# Patient Record
Sex: MALE | Race: WHITE | NOT HISPANIC OR LATINO | Employment: OTHER | URBAN - METROPOLITAN AREA
[De-identification: names, ages, dates, MRNs, and addresses within clinical notes are randomized per-mention and may not be internally consistent; named-entity substitution may affect disease eponyms.]

---

## 2018-06-25 ENCOUNTER — HOSPITAL ENCOUNTER (OUTPATIENT)
Dept: SPEECH THERAPY | Facility: HOSPITAL | Age: 63
Setting detail: THERAPIES SERIES
Discharge: HOME | End: 2018-06-25

## 2018-06-25 DIAGNOSIS — I69.391 DYSPHAGIA AS LATE EFFECT OF CEREBROVASCULAR ACCIDENT (CVA): Primary | ICD-10-CM

## 2018-06-25 PROCEDURE — G8996 SWALLOW CURRENT STATUS: HCPCS

## 2018-06-25 PROCEDURE — G8997 SWALLOW GOAL STATUS: HCPCS

## 2018-06-25 PROCEDURE — 92610 EVALUATE SWALLOWING FUNCTION: CPT

## 2018-06-25 NOTE — THERAPY EVALUATION
Outpatient Speech Language Pathology   Adult Swallow Initial Evaluation  MARNIE Akins     Patient Name: Robert Manning  : 1955  MRN: [id number]  Today's Date: 2018         Visit Date: 2018   There is no problem list on file for this patient.       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 Dysphagia as late effect of cerebrovascular accident (CVA) I69.391 438.82             SLP Adult Swallow Evaluation - 18 1030        Rehab Evaluation    Document Type evaluation  -AD    Total Evaluation Minutes, SLP 60  -AD    Subjective Information no complaints  -AD    Patient Observations alert;cooperative;agree to therapy  -AD    Patient/Family Observations Pt seen upright in a chair in SLP office. Wife remained in the waiting room through most of the evaluation. Present at the end for recommendations and questions.   -AD    Patient Effort good  -AD    Symptoms Noted During/After Treatment other (see comments)   cough  -AD       General Information    Patient Profile Reviewed yes  -AD    Pertinent History Of Current Problem Pt is a 63 year old male referred for outpatient therapy following hospitalization and acute rehab for a stroke. He suffered a right sided stroke with left sided weakness. Onset on 18 with immediate thrombectomy by neurosurgery due to inability to take IV tPA.  Pt with hypersense sign of the right MCA per CT scan. CTA wtih occulusion of distal supraclinoid right ICA and M1/M2 segments fo  the right MCA. Initial symptoms were left sided weakness, slurred speech and confusion. Pt failed initial MBS on 6/11/18 and PEG tube placed on 6/12/18. Repeat MBS on 6/19/18 with continued high risk of aspiration and continued NPO status with PEG tube feedings recommended.   -AD    Current Method of Nutrition NPO   Pt reports some po intake of eggs.   -AD    Precautions/Limitations, Vision WFL;for purposes of eval  -AD    Precautions/Limitations, Hearing WFL;for purposes of eval  -AD    Prior Level of Function-Communication WFL  -AD    Prior Level of Function-Swallowing regular textures;thin liquids;other (see comments)   concerns w/prior dysphagia due to tracheostoma/pt report  -AD    Plans/Goals Discussed with patient;spouse/S.O.;agreed upon  -AD    Barriers to Rehab previous functional deficit  -AD    Patient's Goals for Discharge return to all previous roles/activities;return to regular diet  -AD    Family Goals for Discharge patient able to return to regular diet;patient able to return to all previous activities/roles  -AD       Pain Assessment    Additional Documentation --   no pain reported during evaluation  -AD       Oral Motor and Function    Oral Lesions or Structural Abnormalities and/or variants 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 Pt reports he did not tolerate a trach and this procedure was performed. He states that he did consult an ENT and that the tracheostoma cannot be reversed without significant complications.   -AD    Dentition Assessment natural, present and adequate;teeth are in poor condition  -AD    Secretion Management WNL/WFL;other (see comments)   some loss of secretions out of tracheostoma  -AD    Mucosal Quality moist, healthy  -AD    Volitional Swallow WFL;weak   mild weakness  -AD    Volitional Cough WFL  -AD       Oral Musculature and Cranial Nerve Assessment    Oral Motor General Assessment WFL  -AD    Vocal Impairment, Detail.  Cranial Nerve X (Vagus) vocal quality abnormality (see comments)   breathy due to air escape out of tracheostoma  -AD    Oral Motor, Comment Pt with functional oral motor movement of the lips, tongue, jaw and face. Some decreased laryngeal elevation reported and noted upon palpation. Vocal changes per tracheostoma.  -AD       General Eating/Swallowing Observations    Respiratory Support Currently in Use room air   large bandaid to cover stoma for airway protection/filter  -AD    Eating/Swallowing Skills self-fed;appropriate self-feeding skills observed;respiratory changes during/following eating voice quality changes during/following eating  -AD    Positioning During Eating upright 90 degree;upright in chair  -AD    Utensils Used cup  -AD    Consistencies Trialed thin liquids   water only  -AD       Respiratory    Respiratory Status WFL;room air;during swallowing/eating  -AD       Clinical Swallow Eval    Oral Prep Phase WFL  -AD    Oral Transit WFL  -AD    Oral Residue WFL  -AD    Pharyngeal Phase suspected pharyngeal impairment  -AD    Esophageal Phase unremarkable  -AD    Clinical Swallow Evaluation Summary Pt presents with a moderate-severe oropharyngeal dypshagia with wet vocal quality noted after trials of water from a cup in small sips and prior report from [facility] Re[facility]/MBS. Pt with a delayed cough and throat clearing following trials but he reports that this is his norm even before the recent stroke. Wife agrees with his statement. No other po trials were attempted. Per records, MBS indicated 'delayed swallow initiation and premature loss likely caused from CVA, however osteophyte limited epiglottic inversion and cricopharyngeal opening and allowed for residue accumulation. Deep penetration before the swallow with nectar thick liquid and puree. Inability to cough or clear residue. All penetration/aspiration was silent. Recommend: NPO, with alternate rayne nutrition/hydration Dysphagia therapy Repeat MBSS 3-4  weeks following dysphagia therapy to further assess swallow function and consider further f/u with neurosurgery after completion of DT.'  -AD       Pharyngeal Phase Concerns    Pharyngeal Phase Concerns wet vocal quality;multiple swallows;cough;throat clear  -AD    Wet Vocal Quality thin  -AD    Multiple Swallows thin  -AD    Cough thin  -AD    Throat Clear thin  -AD    Pharyngeal Phase Concerns, Comment Concerns for silent aspiration, decreased laryngeal elevation and decreased airway closure.   -AD       Clinical Impression    SLP Swallowing Diagnosis mod-severe;oral dysfunction;pharyngeal dysfunction  -AD    Functional Impact risk of aspiration/pneumonia;risk of malnutrition  -AD    Rehab Potential/Prognosis, Swallowing adequate, monitor progress closely  -AD    Criteria for Skilled Therapeutic Interventions Met demonstrates skilled criteria  -AD    Equipment Issued to Patient --   tracheosponges  -AD       Recommendations    Therapy Frequency (Swallow) 1 day per week  -AD    Predicted Duration Therapy Intervention (Days) other (see comments)   1 month  -AD    SLP Diet Recommendation NPO;other (see comments)   ice chips, small sips of water after oral care  -AD    Recommended Diagnostics reassess via VFSS (MBS);reassess via FEES   may benefit from FEES due to anatomical changes  -AD    SLP Rec. for Method of Medication Administration meds via alternate route  -AD    Monitor for Signs of Aspiration yes;cough;elevated WBC count;gurgly voice;throat clearing;pneumonia;right lower lobe infiltrates;none - silent aspiration present  -AD    Anticipated Dischage Disposition home  -AD      User Key  (r) = Recorded By, (t) = Taken By, (c) = Cosigned By    Initials Name Provider Type    LULA Matos MS [facility]-SLP Speech Therapist         Note patient and wife declined cognitive communication evaluation due to both feeling he is back at his baseline status prior to this stroke. Education provided regarding risks and  benefits of this evaluation. Both verbalized understanding and willing to take the risk of not proceeding with the evaluation.           OP SLP Education     Row Name 06/25/18 1030       Education    Barriers to Learning Resistent to information  -AD    Action Taken to Address Barriers Education on risks and benefits of therapy and po intake.   -AD    Education Provided Described results of evaluation;Patient expressed understanding of evaluation;Family/caregivers expressed understanding of evaluation;Patient participated in establishing goals and treatment plan;Family/caregivers participated in establishing goals and treatment plan;Patient demonstrated recommended strategies;Family/caregivers demonstrated recommended strategies;Patient requires further education on strategies, risks;Family/caregivers require further education on strategies, risks  -AD    Assessed Learning needs;Learning motivation;Learning preferences;Learning readiness  -AD    Learning Motivation Strong  -AD    Learning Method Explanation;Demonstration;Teach back;Written materials  -AD    Teaching Response Verbalized understanding;Demonstrated understanding;Reinforcement needed  -AD    Education Comments Pt given laryngeal and pharyngeal exercises for home use. SLP reviewed and patient able to perform repeat demonstration. Education to pt/wife regarding aspiration and risks including pneumonia, choking and death. Both verbalized understanding and willing to take risks associated with po intake at this time.   -AD      User Key  (r) = Recorded By, (t) = Taken By, (c) = Cosigned By    Initials Name Effective Dates    AD Katie Matos MS [facility]-SLP 06/22/16 -                 SLP OP Goals     Row Name 06/25/18 1030          Goal Type Needed    Goal Type Needed Dysphagia  -AD        Subjective Comments    Subjective Comments Pt was seen for an initial evaluation for 60 minutes and was alert and cooperative during the eval. His wife was present for a  summary of results and recommendations. She also provided agreement to patient report of history and recent events.   -AD        Subjective Pain    Able to rate subjective pain? --   no pain reported  -AD        Dysphagia Goals    Dysphagia LTG's Pt will tolerate some PO diet w/o aspiration to adequately meet nutrition/hydration needs  -AD     Pt will tolerate some PO diet w/o aspiration to adequately meet nutrition/hydration needs diet to be determined via repeat MBS/FEES in one month.   -AD     Status: Pt will tolerate some PO diet w/o aspiration to adequately meet nutrition/hydration needs New  -AD     Patient will increase laryngeal elevation to reduce residue that might fall into airway by completing Mendelsohn maneuver;falsetto/pitch glide  -AD     Status: Patient will increase laryngeal elevation to reduce residue that might fall into airway by completing New  -AD     Patient will increase closure of larynx to keep food from falling into the airway by completing with intermittent cues;produce vowel with hard glottal attack   80% and use of stoma closure via hand  -AD     Status: Patient will increase closure of larynx to keep food from falling into the airway by completing New  -AD     Patient will increase strength of tongue base and posterior pharyngeal walls to reduce residue that might fall into airway by completing effotful swallow;Jesenia (tongue hold);tongue base retraction;with intermittent cues   80%  -AD     Status: Patient will increase strength of tongue base and posterior pharyngeal walls to reduce residue that might fall into airway by completing New  -AD     Patient will increase tipping of arytenoids and closure at entrance to the airway to reduce penetration into the vestibule by completing super-supraglottic swallow;with intermittent cues   80% of trials of water  -AD     Status: Patient will increase tipping of arytenoids and closure at entrance to the airway to reduce penetration into the  vestibule by completing New  -AD        SLP Time Calculation    SLP Goal Re-Cert Due Date 07/25/18  -AD       User Key  (r) = Recorded By, (t) = Taken By, (c) = Cosigned By    Initials Name Provider Type    LULA Matos MS CCC-SLP Speech Therapist                OP SLP Assessment/Plan - 06/25/18 1030        SLP Assessment    Functional Problems Swallowing  -AD    Impact on Function: Swallowing Risk of malnourishment;Risk of aspiration;Risk of pneumonia  -AD    Clinical Impression: Swallowing Moderate-Severe:;oropharyngeal phase dysphagia  -AD    SLP Diagnosis Moderate to severe oropharyngeal dysphagia  -AD    Prognosis Fair (comment)   due to suspected prior deficits and tracheostoma/osteophytes  -AD    Patient/caregiver participated in establishment of treatment plan and goals Yes  -AD    Patient would benefit from skilled therapy intervention Yes  -AD       SLP Plan    Frequency once weekly  -AD    Duration 1 month  -AD    Planned CPT's? SLP SWALLOW THERAPY: 89877;SLP MBS: 59418  -AD    Expected Duration Therapy Session - minutes 30-45 minutes  -AD      User Key  (r) = Recorded By, (t) = Taken By, (c) = Cosigned By    Initials Name Provider Type    LULA Matos MS CCC-SLP Speech Therapist          Time Calculation:   SLP Start Time: 0930  SLP Stop Time: 1030  SLP Time Calculation (min): 60 min  SLP Non-Billable Time (min): 0 min  Total Timed Code Minutes- SLP: 0 minute(s)    Therapy Charges for Today     Code Description Service Date Service Provider Modifiers Qty    39139202168 HC ST EVAL ORAL PHARYNG SWALLOW 4 6/25/2018 Katie Matos MS CCC-SLP GN 1    [id number] HC ST SWALLOWING CURRENT STATUS 6/25/2018 Katie Matos MS CCC-SLP GN, CN 1    [id number] HC ST SWALLOWING PROJECTED 6/25/2018 Katie Matos MS CCC-SLP ELIDIA, CK 1        SLP G-Codes  SLP NOMS Used?: Yes  Functional Limitations: Swallowing  Swallow Current Status (): 100 percent impaired, limited or  restricted  Swallow Goal Status (): At least 40 percent but less than 60 percent impaired, limited or restricted      Katie Matos, MS CCC-SLP  6/25/2018

## 2018-07-03 ENCOUNTER — HOSPITAL ENCOUNTER (OUTPATIENT)
Dept: SPEECH THERAPY | Facility: HOSPITAL | Age: 63
Setting detail: THERAPIES SERIES
Discharge: HOME | End: 2018-07-03

## 2018-07-03 DIAGNOSIS — I69.391 DYSPHAGIA AS LATE EFFECT OF CEREBROVASCULAR ACCIDENT (CVA): Primary | ICD-10-CM

## 2018-07-03 PROCEDURE — 92526 ORAL FUNCTION THERAPY: CPT

## 2018-07-03 NOTE — THERAPY TREATMENT NOTE
Outpatient Speech Language Pathology   Adult Swallow Treatment Note  MARNIE Akins     Patient Name: Robert Manning  : 1955  MRN: [id number]  Today's Date: 7/3/2018         Visit Date: 2018   There is no problem list on file for this patient.       Visit Dx:    ICD-10-CM ICD-9-CM   1. Dysphagia as late effect of cerebrovascular accident (CVA) I69.391 438.82           SLP OP Goals     Row Name 18 0945          Goal Type Needed    Goal Type Needed Dysphagia  -AD        Subjective Comments    Subjective Comments Pt was seen in therapy for 40 minutes and was alert and cooperative.   -AD        Subjective Pain    Able to rate subjective pain? --   no pain reported.   -AD        Dysphagia Goals    Dysphagia LTG's Pt will tolerate some PO diet w/o aspiration to adequately meet nutrition/hydration needs  -AD     Pt will tolerate some PO diet w/o aspiration to adequately meet nutrition/hydration needs diet to be determined via repeat MBS/FEES in one month.   -AD     Status: Pt will tolerate some PO diet w/o aspiration to adequately meet nutrition/hydration needs Progressing as expected  -AD     Comments: Pt will tolerate some PO diet w/o aspiration to adequately meet nutrition/hydration needs Pt states that he is taking po by mouth as well as by his feeding tube. He states that he is tolerating about the equivalent of 2 meals per day and taking anywhere from 3-4 bottles of tube feeding a day with water flushes. He states he is tolerating all kinds of food and thin liquids including steak and would like to get his feeding tube taken out. SLP provided education regarding risk of aspiration and complications such as aspiration pneumonia. Pt verbalizes understanding and chooses to accept the risks and continue to eat.   -AD     Patient will increase laryngeal elevation to reduce residue that might fall into airway by completing Mendelsohn maneuver;falsetto/pitch glide  -AD     Status: Patient will increase  laryngeal elevation to reduce residue that might fall into airway by completing Progress slower than expected  -AD     Comments: Patient will increase laryngeal elevation to reduce residue that might fall into airway by completing Pt attempts to perform the Mendelsohn, but continues to have limited holding of elevation during the swallow. Only able to attempt on 2 trials. Able to perform falsetto and pitch glides with consistent verbal cues for 10 trials each. Limited high pitch achieved, but improved from previous session with use of model and consistent verbal prompts to raise higher. Better performance noted on pitch glides vs falsetto.  -AD     Patient will increase closure of larynx to keep food from falling into the airway by completing with intermittent cues;produce vowel with hard glottal attack   80% and use of stoma closure via hand  -AD     Status: Patient will increase closure of larynx to keep food from falling into the airway by completing Progressing as expected  -AD     Comments: Patient will increase closure of larynx to keep food from falling into the airway by completing Attempted closure of the stoma with external coverage by the hand (over the bandage used as a filter). No significant change noted. Pt was able to perofm a hard glottal attack on a vowel with only the bandage and no external pressure on 8/10 trials without cues.   -AD     Patient will increase strength of tongue base and posterior pharyngeal walls to reduce residue that might fall into airway by completing effotful swallow;Jesenia (tongue hold);tongue base retraction;with intermittent cues   80%  -AD     Status: Patient will increase strength of tongue base and posterior pharyngeal walls to reduce residue that might fall into airway by completing Progressing as expected  -AD     Comments: Patient will increase strength of tongue base and posterior pharyngeal walls to reduce residue that might fall into airway by completing Pt was  able to perform tongue hold with tongue to edge of lips on 10/10 trials; tongue base retraction on 10/10 trials w/o cues; effortful swallow on 4/5 trials with inconsistent verbal prompts.   -AD     Patient will increase tipping of arytenoids and closure at entrance to the airway to reduce penetration into the vestibule by completing super-supraglottic swallow;with intermittent cues   80% of trials of water  -AD     Status: Patient will increase tipping of arytenoids and closure at entrance to the airway to reduce penetration into the vestibule by completing Progress slower than expected  -AD     Comments: Patient will increase tipping of arytenoids and closure at entrance to the airway to reduce penetration into the vestibule by completing Able to perform with consistent cues on 3/6 trials. To continue at home.   -AD        SLP Time Calculation    SLP Goal Re-Cert Due Date 07/25/18  -AD       User Key  (r) = Recorded By, (t) = Taken By, (c) = Cosigned By    Initials Name Provider Type    LULA Matos MS CCC-SLP Speech Therapist                OP SLP Education     Row Name 07/03/18 0945       Education    Education Comments Pt demonstrates use of HEP for improved laryngeal elevation and BOT exercises. Non compliance with NPO recommendation but verbalizes understanding of the risks and willing to take those risks. Understanding of HME and recommendation for repeat MBS soon.   -AD      User Key  (r) = Recorded By, (t) = Taken By, (c) = Cosigned By    Initials Name Effective Dates    LULA Matos MS CCC-SLP 06/22/16 -                 OP SLP Assessment/Plan - 07/03/18 0945        SLP Assessment    Clinical Impression Comments Pt continues to demonstrate good performance on his home exercises and can relay the frequency and duration. He is not compliant with NPO recommendations but has not demonstrated any complications at his point and time and reports no choking. Good tolerance of exercises with  improved vocal quality today and less throat clearing and wet quality following trials of water.   -AD       SLP Plan    Plan Comments Will look into HME for patient and contact physician for order. This may help restore a more closed swallowing mechanism and improved swallowing functioning. Will also contact Dr. Rhodes about repeating the MBS soon due to noncompliance with NPO recommendation.   -AD      User Key  (r) = Recorded By, (t) = Taken By, (c) = Cosigned By    Initials Name Provider Type    AD Katie Matos MS CCC-SLP Speech Therapist        Time Calculation:   SLP Start Time: 0905  SLP Stop Time: 0945  SLP Time Calculation (min): 40 min  SLP Non-Billable Time (min): 0 min  Total Timed Code Minutes- SLP: 0 minute(s)    Therapy Charges for Today     Code Description Service Date Service Provider Modifiers Qty    [id number] HC ST TREATMENT SWALLOW 3 7/3/2018 Katie Matos MS CCC-SLP GN 1        Katie Matos MS CCC-SLP  7/3/2018

## 2018-07-19 ENCOUNTER — HOSPITAL ENCOUNTER (OUTPATIENT)
Dept: SPEECH THERAPY | Facility: HOSPITAL | Age: 63
Setting detail: THERAPIES SERIES
Discharge: HOME | End: 2018-07-19

## 2018-07-19 DIAGNOSIS — I69.391 DYSPHAGIA AS LATE EFFECT OF CEREBROVASCULAR ACCIDENT (CVA): Primary | ICD-10-CM

## 2018-07-19 NOTE — THERAPY TREATMENT NOTE
Outpatient Speech Language Pathology   Adult Swallow Follow Up Note  MARNIE Akins     Patient Name: Robert Manning  : 1955  MRN: [id number]  Today's Date: 2018         Visit Date: 2018   There is no problem list on file for this patient.       Visit Dx:    ICD-10-CM ICD-9-CM   1. Dysphagia as late effect of cerebrovascular accident (CVA) I69.391 438.82           SLP OP Goals     Row Name 18 1320          Goal Type Needed    Goal Type Needed Dysphagia  -AD        Subjective Comments    Subjective Comments Pt seen in speech therapy office for 15 minutes for review of current status. Pt alert and cooperative.   -AD        Subjective Pain    Able to rate subjective pain? --   no pain reported  -AD        Dysphagia Goals    Dysphagia LTG's Pt will tolerate some PO diet w/o aspiration to adequately meet nutrition/hydration needs  -AD     Pt will tolerate some PO diet w/o aspiration to adequately meet nutrition/hydration needs diet to be determined via repeat MBS/FEES in one month.   -AD     Status: Pt will tolerate some PO diet w/o aspiration to adequately meet nutrition/hydration needs Progressing as expected  -AD     Comments: Pt will tolerate some PO diet w/o aspiration to adequately meet nutrition/hydration needs Pt reports continued po intake of a regular diet with thin liquids. He states he is having no issues and has only used his tube one time in the last week. He states he is tolerating 3 meals per day and is following up with the surgeon who put in his PEG tube for possible removal next week. Discussion regarding follow up MBS. Pt states he would like to think about proceeding with this until after he sees the surgeon. SLP stated physician contacted for order and will follow up with the patient when the order received and after his appointment next week.   -AD     Patient will increase laryngeal elevation to reduce residue that might fall into airway by completing Mendelsohn  maneuver;falsetto/pitch glide  -AD     Status: Patient will increase laryngeal elevation to reduce residue that might fall into airway by completing Progress slower than expected;Discontinued  -AD     Comments: Patient will increase laryngeal elevation to reduce residue that might fall into airway by completing Pt able to perform pitch glides consistently with minimal cues for increased range when possible. He is still unable to perform the Mendelsohn despite cues and effort. Discontinued. Pt to continue with pitch glides at home per HEP.   -AD     Patient will increase closure of larynx to keep food from falling into the airway by completing with intermittent cues;produce vowel with hard glottal attack   80% and use of stoma closure via hand  -AD     Status: Patient will increase closure of larynx to keep food from falling into the airway by completing Achieved  -AD     Comments: Patient will increase closure of larynx to keep food from falling into the airway by completing Pt able to produce a vowel with a hard glottal attack without closure of the stoma with 80% spontaneously.   -AD     Patient will increase strength of tongue base and posterior pharyngeal walls to reduce residue that might fall into airway by completing effotful swallow;Jesenia (tongue hold);tongue base retraction;with intermittent cues   80%   -AD     Status: Patient will increase strength of tongue base and posterior pharyngeal walls to reduce residue that might fall into airway by completing Achieved  -AD     Comments: Patient will increase strength of tongue base and posterior pharyngeal walls to reduce residue that might fall into airway by completing Pt able to perform tongue base retraction exercises, jesenia, and effortful swallow independently without cues. Reports use of HEP for these at home.   -AD     Patient will increase tipping of arytenoids and closure at entrance to the airway to reduce penetration into the vestibule by completing  super-supraglottic swallow;with intermittent cues   80% of trials of water  -AD     Status: Patient will increase tipping of arytenoids and closure at entrance to the airway to reduce penetration into the vestibule by completing Progress slower than expected;Discontinued  -AD     Comments: Patient will increase tipping of arytenoids and closure at entrance to the airway to reduce penetration into the vestibule by completing Not targeted today. Pt able to perform prior on 50% of trials with consistent cues. He reports use at home but unsure of consistent nature and need for MBS to determine if effective and appropriate.   -AD        SLP Time Calculation    SLP Goal Re-Cert Due Date 07/25/18  -AD       User Key  (r) = Recorded By, (t) = Taken By, (c) = Cosigned By    Initials Name Provider Type    LULA Matos MS CCC-SLP Speech Therapist                OP SLP Education     Row Name 07/19/18 1320       Education    Education Comments Pt independent with HEP and understanding of risks of po diet intake.   -AD      User Key  (r) = Recorded By, (t) = Taken By, (c) = Cosigned By    Initials Name Effective Dates    LULA Matos MS CCC-SLP 06/22/16 -                 OP SLP Assessment/Plan - 07/19/18 1320        SLP Assessment    Clinical Impression Comments Pt demonstrates independence with his home exercise program with the exception of Mendelsohn and SSG. Pt continues to report intake of a regular diet with thin liquids w/o signs of aspiration pneumonia. Some signs of aspiration reported on chocolate and occasional choking at times but reports this baseline. Follow up with surgeon to occur next week and will further discuss repeat MBS after that time. SLP also working on get some type of HME device to assist with humidification and filtering of his stoma. Awaiting contact from primary care physician and HME representative. Will contact pt when this is completed.   -AD    SLP Diagnosis Moderate to severe  oropharyngeal dysphagia.   -AD       SLP Plan    Plan Comments Await return phone calls regarding HME and MBS orders.   -AD      User Key  (r) = Recorded By, (t) = Taken By, (c) = Cosigned By    Initials Name Provider Type    AD Katie Matos, MS CCC-SLP Speech Therapist          Time Calculation:   SLP Start Time: 1305  SLP Stop Time: 1320  SLP Time Calculation (min): 15 min  SLP Non-Billable Time (min): 0 min  Total Timed Code Minutes- SLP: 0 minute(s)     No charge filed.     Katie Matos, MS CCC-SLP  7/19/2018

## 2018-08-16 ENCOUNTER — DOCUMENTATION (OUTPATIENT)
Dept: SPEECH THERAPY | Facility: HOSPITAL | Age: 63
End: 2018-08-16

## 2018-08-16 NOTE — THERAPY TREATMENT NOTE
Outpatient Speech Language Pathology   Adult Swallow Documentation Note       Patient Name: Robert Manning  : 1955  MRN: [id number]  Today's Date: 2018         Visit Date: 2018         SLP saw patient today with representative, Rashard Pruett, from [employer] for demonstration and fitting of an HME device. Pt able to try on devices for use with his permanent tracheostoma. Samples given and information for ordering also given. Will need a physician's prescription to order on a regular basis if patient interested. Contact information given and pt to return call to SLP if he likes the HME and would like to order more.     Products trialed and samples provided were:     [employer] EasyFLow HME cartridge  [employer] TruSeal Adhesive Housing      Time Calculation:   SLP Start Time: 1400    Await return call from patient before proceeding further with order.    Katie Matos MS CCC-SLP  2018

## 2019-03-27 ENCOUNTER — HOSPITAL ENCOUNTER (OUTPATIENT)
Dept: GENERAL RADIOLOGY | Facility: HOSPITAL | Age: 64
Discharge: HOME | End: 2019-03-27
Admitting: INTERNAL MEDICINE

## 2019-03-27 ENCOUNTER — TRANSCRIBE ORDERS (OUTPATIENT)
Dept: ADMINISTRATIVE | Facility: HOSPITAL | Age: 64
End: 2019-03-27

## 2019-03-27 DIAGNOSIS — R04.2 HEMOPTYSIS: Primary | ICD-10-CM

## 2019-03-27 DIAGNOSIS — R04.2 HEMOPTYSIS: ICD-10-CM

## 2019-03-27 PROCEDURE — 71046 X-RAY EXAM CHEST 2 VIEWS: CPT

## 2019-04-21 RX ORDER — SODIUM CHLORIDE 9 MG/ML
INJECTION, SOLUTION INTRAVENOUS
Status: DISPENSED
Start: 2019-04-21 | End: 2019-04-22

## 2020-10-09 ENCOUNTER — TRANSCRIBE ORDERS (OUTPATIENT)
Dept: ADMINISTRATIVE | Facility: HOSPITAL | Age: 65
End: 2020-10-09

## 2020-10-09 DIAGNOSIS — R16.0 HEPATOMEGALY: ICD-10-CM

## 2020-10-09 DIAGNOSIS — E04.9 GOITER: Primary | ICD-10-CM

## 2020-10-09 DIAGNOSIS — F17.290 NICOTINE DEPENDENCE, OTHER TOBACCO PRODUCT, UNCOMPLICATED: ICD-10-CM

## 2020-10-27 ENCOUNTER — APPOINTMENT (OUTPATIENT)
Dept: BONE DENSITY | Facility: HOSPITAL | Age: 65
End: 2020-10-27

## 2021-02-01 ENCOUNTER — DOCUMENTATION (OUTPATIENT)
Dept: SURGERY | Facility: CLINIC | Age: 66
End: 2021-02-01

## 2021-02-01 PROBLEM — Z86.73 PERSONAL HISTORY OF TRANSIENT ISCHEMIC ATTACK (TIA), AND CEREBRAL INFARCTION WITHOUT RESIDUAL DEFICITS: Status: ACTIVE | Noted: 2018-09-05

## 2021-02-01 PROBLEM — I49.1 APC (ATRIAL PREMATURE CONTRACTIONS): Status: ACTIVE | Noted: 2018-06-09

## 2021-02-01 PROBLEM — Z87.19 HISTORY OF GI BLEED: Status: ACTIVE | Noted: 2018-09-27

## 2021-02-01 PROBLEM — R94.39 ABNORMAL NUCLEAR STRESS TEST: Status: ACTIVE | Noted: 2017-11-21

## 2021-02-01 PROBLEM — I47.19 ATRIAL TACHYCARDIA: Status: ACTIVE | Noted: 2017-06-13

## 2021-02-01 PROBLEM — I47.1 ATRIAL TACHYCARDIA: Status: ACTIVE | Noted: 2017-06-13

## 2021-02-01 PROBLEM — I48.0 PAROXYSMAL ATRIAL FIBRILLATION (HCC): Status: ACTIVE | Noted: 2018-06-09

## 2021-02-01 PROBLEM — I25.5 ISCHEMIC CARDIOMYOPATHY: Status: ACTIVE | Noted: 2018-01-23

## 2021-02-01 PROBLEM — T82.198A PRESENCE OF MANUFACTURER-RECALLED ICD LEAD: Status: ACTIVE | Noted: 2017-03-28

## 2021-02-01 PROBLEM — Z45.02 ICD (IMPLANTABLE CARDIOVERTER-DEFIBRILLATOR) BATTERY DEPLETION: Status: ACTIVE | Noted: 2017-03-28

## 2021-02-01 PROBLEM — Z95.818 PRESENCE OF WATCHMAN LEFT ATRIAL APPENDAGE CLOSURE DEVICE: Status: ACTIVE | Noted: 2018-12-20

## 2021-02-01 PROBLEM — Z43.1 ATTENTION TO G-TUBE: Status: ACTIVE | Noted: 2018-07-24

## 2021-02-01 PROBLEM — I63.231 ACUTE ISCHEMIC RIGHT ICA STROKE: Status: ACTIVE | Noted: 2018-06-07

## 2021-04-07 RX ORDER — FLUOXETINE 20 MG/1
TABLET, FILM COATED ORAL
Qty: 90 TABLET | Refills: 0 | Status: SHIPPED | OUTPATIENT
Start: 2021-04-07 | End: 2022-02-09

## 2021-08-27 ENCOUNTER — OFFICE VISIT (OUTPATIENT)
Dept: INTERNAL MEDICINE | Facility: CLINIC | Age: 66
End: 2021-08-27

## 2021-08-27 VITALS
HEIGHT: 70 IN | WEIGHT: 236.8 LBS | HEART RATE: 70 BPM | TEMPERATURE: 96.9 F | OXYGEN SATURATION: 92 % | SYSTOLIC BLOOD PRESSURE: 146 MMHG | RESPIRATION RATE: 16 BRPM | DIASTOLIC BLOOD PRESSURE: 86 MMHG | BODY MASS INDEX: 33.9 KG/M2

## 2021-08-27 DIAGNOSIS — Z11.59 ENCOUNTER FOR HCV SCREENING TEST FOR LOW RISK PATIENT: ICD-10-CM

## 2021-08-27 DIAGNOSIS — I48.91 ATRIAL FIBRILLATION, UNSPECIFIED TYPE (HCC): ICD-10-CM

## 2021-08-27 DIAGNOSIS — I10 ESSENTIAL HYPERTENSION: Primary | ICD-10-CM

## 2021-08-27 DIAGNOSIS — R73.01 ELEVATED FASTING GLUCOSE: ICD-10-CM

## 2021-08-27 DIAGNOSIS — Z11.4 ENCOUNTER FOR SCREENING FOR HIV: ICD-10-CM

## 2021-08-27 DIAGNOSIS — E78.2 MIXED HYPERLIPIDEMIA: ICD-10-CM

## 2021-08-27 PROCEDURE — 99204 OFFICE O/P NEW MOD 45 MIN: CPT | Performed by: INTERNAL MEDICINE

## 2021-08-27 RX ORDER — DILTIAZEM HYDROCHLORIDE 60 MG/1
60 TABLET, FILM COATED ORAL
COMMUNITY
Start: 2021-07-17 | End: 2022-12-29

## 2021-08-27 RX ORDER — CLOPIDOGREL BISULFATE 75 MG/1
TABLET ORAL
COMMUNITY
Start: 2021-06-02

## 2021-08-27 RX ORDER — SOTALOL HYDROCHLORIDE 80 MG/1
TABLET ORAL
COMMUNITY
Start: 2021-05-28

## 2021-08-27 RX ORDER — LOSARTAN POTASSIUM 25 MG/1
25 TABLET ORAL DAILY
COMMUNITY
Start: 2021-07-24

## 2021-08-27 RX ORDER — ASPIRIN 81 MG/1
81 TABLET ORAL DAILY
COMMUNITY
Start: 2021-06-08

## 2021-08-27 RX ORDER — CARVEDILOL 12.5 MG/1
12.5 TABLET ORAL
COMMUNITY
Start: 2021-07-17 | End: 2023-01-02

## 2021-08-28 LAB
ALBUMIN SERPL-MCNC: 3.9 G/DL (ref 3.8–4.8)
ALBUMIN/GLOB SERPL: 1.6 {RATIO} (ref 1.2–2.2)
ALP SERPL-CCNC: 210 IU/L (ref 48–121)
ALT SERPL-CCNC: 15 IU/L (ref 0–44)
AST SERPL-CCNC: 19 IU/L (ref 0–40)
BASOPHILS # BLD AUTO: 0.1 X10E3/UL (ref 0–0.2)
BASOPHILS NFR BLD AUTO: 1 %
BILIRUB SERPL-MCNC: 0.5 MG/DL (ref 0–1.2)
BUN SERPL-MCNC: 8 MG/DL (ref 8–27)
BUN/CREAT SERPL: 9 (ref 10–24)
CALCIUM SERPL-MCNC: 9 MG/DL (ref 8.6–10.2)
CHLORIDE SERPL-SCNC: 102 MMOL/L (ref 96–106)
CHOLEST SERPL-MCNC: 160 MG/DL (ref 100–199)
CHOLEST/HDLC SERPL: 3.6 RATIO (ref 0–5)
CO2 SERPL-SCNC: 29 MMOL/L (ref 20–29)
CREAT SERPL-MCNC: 0.85 MG/DL (ref 0.76–1.27)
EOSINOPHIL # BLD AUTO: 0.2 X10E3/UL (ref 0–0.4)
EOSINOPHIL NFR BLD AUTO: 3 %
ERYTHROCYTE [DISTWIDTH] IN BLOOD BY AUTOMATED COUNT: 12.9 % (ref 11.6–15.4)
GLOBULIN SER CALC-MCNC: 2.5 G/DL (ref 1.5–4.5)
GLUCOSE SERPL-MCNC: 72 MG/DL (ref 65–99)
HBA1C MFR BLD: 7 % (ref 4.8–5.6)
HCT VFR BLD AUTO: 44.3 % (ref 37.5–51)
HCV AB S/CO SERPL IA: <0.1 S/CO RATIO (ref 0–0.9)
HDLC SERPL-MCNC: 44 MG/DL
HGB BLD-MCNC: 15.2 G/DL (ref 13–17.7)
HIV 1+2 AB+HIV1 P24 AG SERPL QL IA: NON REACTIVE
IMM GRANULOCYTES # BLD AUTO: 0 X10E3/UL (ref 0–0.1)
IMM GRANULOCYTES NFR BLD AUTO: 0 %
LDLC SERPL CALC-MCNC: 70 MG/DL (ref 0–99)
LYMPHOCYTES # BLD AUTO: 1.8 X10E3/UL (ref 0.7–3.1)
LYMPHOCYTES NFR BLD AUTO: 28 %
MCH RBC QN AUTO: 32.4 PG (ref 26.6–33)
MCHC RBC AUTO-ENTMCNC: 34.3 G/DL (ref 31.5–35.7)
MCV RBC AUTO: 95 FL (ref 79–97)
MONOCYTES # BLD AUTO: 0.9 X10E3/UL (ref 0.1–0.9)
MONOCYTES NFR BLD AUTO: 15 %
NEUTROPHILS # BLD AUTO: 3.4 X10E3/UL (ref 1.4–7)
NEUTROPHILS NFR BLD AUTO: 53 %
PLATELET # BLD AUTO: 202 X10E3/UL (ref 150–450)
POTASSIUM SERPL-SCNC: 3.9 MMOL/L (ref 3.5–5.2)
PROT SERPL-MCNC: 6.4 G/DL (ref 6–8.5)
RBC # BLD AUTO: 4.69 X10E6/UL (ref 4.14–5.8)
SODIUM SERPL-SCNC: 144 MMOL/L (ref 134–144)
TRIGL SERPL-MCNC: 290 MG/DL (ref 0–149)
VLDLC SERPL CALC-MCNC: 46 MG/DL (ref 5–40)
WBC # BLD AUTO: 6.4 X10E3/UL (ref 3.4–10.8)

## 2021-09-15 ENCOUNTER — OFFICE VISIT (OUTPATIENT)
Dept: INTERNAL MEDICINE | Facility: CLINIC | Age: 66
End: 2021-09-15

## 2021-09-15 VITALS
OXYGEN SATURATION: 98 % | DIASTOLIC BLOOD PRESSURE: 90 MMHG | HEART RATE: 88 BPM | SYSTOLIC BLOOD PRESSURE: 148 MMHG | RESPIRATION RATE: 18 BRPM | BODY MASS INDEX: 33.5 KG/M2 | TEMPERATURE: 98.1 F | HEIGHT: 70 IN | WEIGHT: 234 LBS

## 2021-09-15 DIAGNOSIS — E11.65 TYPE 2 DIABETES MELLITUS WITH HYPERGLYCEMIA, WITHOUT LONG-TERM CURRENT USE OF INSULIN (HCC): ICD-10-CM

## 2021-09-15 DIAGNOSIS — I48.0 PAROXYSMAL ATRIAL FIBRILLATION (HCC): ICD-10-CM

## 2021-09-15 DIAGNOSIS — I25.10 CORONARY ARTERY DISEASE INVOLVING NATIVE CORONARY ARTERY OF NATIVE HEART, ANGINA PRESENCE UNSPECIFIED: Primary | ICD-10-CM

## 2021-09-15 PROCEDURE — 99214 OFFICE O/P EST MOD 30 MIN: CPT | Performed by: INTERNAL MEDICINE

## 2021-09-20 NOTE — PROGRESS NOTES
"Chief Complaint  Diabetes (follow up )    Subjective          Robert Mnaning presents to [facility]  Here to follow up DM, doing well; last a1c at 7, was not taking meds prior to discussion today; no chest pain, no lows      Objective   Vital Signs:   /90   Pulse 88   Temp 98.1 °F (36.7 °C)   Resp 18   Ht 176.5 cm (69.5\")   Wt 106 kg (234 lb)   SpO2 98%   BMI 34.06 kg/m²     Physical Exam  Vitals and nursing note reviewed.   Constitutional:       General: He is not in acute distress.     Appearance: Normal appearance.   HENT:      Head: Normocephalic and atraumatic.      Right Ear: External ear normal.      Left Ear: External ear normal.      Nose: Nose normal.      Mouth/Throat:      Mouth: Mucous membranes are moist.      Pharynx: Oropharynx is clear.   Eyes:      Extraocular Movements: Extraocular movements intact.      Conjunctiva/sclera: Conjunctivae normal.      Pupils: Pupils are equal, round, and reactive to light.   Cardiovascular:      Rate and Rhythm: Normal rate and regular rhythm.      Pulses: Normal pulses.      Heart sounds: Normal heart sounds. No murmur heard.   No gallop.    Pulmonary:      Effort: Pulmonary effort is normal.      Breath sounds: Normal breath sounds.   Abdominal:      General: Abdomen is flat. Bowel sounds are normal. There is no distension.      Palpations: Abdomen is soft. There is no mass.      Tenderness: There is no abdominal tenderness.   Musculoskeletal:         General: No swelling. Normal range of motion.      Cervical back: Normal range of motion and neck supple.   Skin:     General: Skin is warm and dry.      Findings: No rash.   Neurological:      General: No focal deficit present.      Mental Status: He is alert and oriented to person, place, and time. Mental status is at baseline.   Psychiatric:         Mood and Affect: Mood normal.         Behavior: Behavior normal.        Result Review :               "   Assessment and Plan    Diagnoses and all orders for this visit:    1. Coronary artery disease involving native coronary artery of native heart, angina presence unspecified (Primary)    2. Paroxysmal atrial fibrillation (CMS/[facility])    3. Type 2 diabetes mellitus with hyperglycemia, without long-term current use of insulin (CMS/[facility])    Other orders  -     empagliflozin (JARDIANCE) 10 MG tablet tablet; Take 1 tablet by mouth Daily.  Dispense: 30 tablet; Refill: 4    - reviewed a1c, cmp, lipid panel; conitnue ASA, atorva 80mg for CAD, CVA history  - losartan and coreg for HTN, doing well without issues  - start empaglifloxin to help with DM management but also CVD risks reduction, HF history    Follow Up   No follow-ups on file.  Patient was given instructions and counseling regarding his condition or for health maintenance advice. Please see specific information pulled into the AVS if appropriate.

## 2021-10-15 ENCOUNTER — HOSPITAL ENCOUNTER (OUTPATIENT)
Dept: CT IMAGING | Facility: HOSPITAL | Age: 66
Discharge: HOME | End: 2021-10-15
Admitting: INTERNAL MEDICINE

## 2021-10-15 ENCOUNTER — OFFICE VISIT (OUTPATIENT)
Dept: INTERNAL MEDICINE | Facility: CLINIC | Age: 66
End: 2021-10-15

## 2021-10-15 VITALS
SYSTOLIC BLOOD PRESSURE: 134 MMHG | WEIGHT: 235 LBS | HEART RATE: 88 BPM | OXYGEN SATURATION: 91 % | TEMPERATURE: 97 F | RESPIRATION RATE: 20 BRPM | BODY MASS INDEX: 33.64 KG/M2 | HEIGHT: 70 IN | DIASTOLIC BLOOD PRESSURE: 80 MMHG

## 2021-10-15 DIAGNOSIS — R06.02 SHORTNESS OF BREATH: Primary | ICD-10-CM

## 2021-10-15 DIAGNOSIS — E61.1 IRON DEFICIENCY: ICD-10-CM

## 2021-10-15 DIAGNOSIS — R53.83 OTHER FATIGUE: ICD-10-CM

## 2021-10-15 DIAGNOSIS — I10 HYPERTENSION, UNSPECIFIED TYPE: ICD-10-CM

## 2021-10-15 DIAGNOSIS — R06.02 SOB (SHORTNESS OF BREATH): ICD-10-CM

## 2021-10-15 PROCEDURE — 99214 OFFICE O/P EST MOD 30 MIN: CPT | Performed by: INTERNAL MEDICINE

## 2021-10-15 PROCEDURE — 71275 CT ANGIOGRAPHY CHEST: CPT

## 2021-10-15 PROCEDURE — 0 IOPAMIDOL PER 1 ML: Performed by: INTERNAL MEDICINE

## 2021-10-15 RX ADMIN — IOPAMIDOL 100 ML: 755 INJECTION, SOLUTION INTRAVENOUS at 13:30

## 2021-10-15 NOTE — PROGRESS NOTES
"Chief Complaint  Diabetes (follow up ) and Hypertension (follow up )    Subjective          Robert Manning presents to [facility]  History of Present Illness     Here for follow up of diabetes, HTN, and new problem worsened shortness of breath     Diabetes   -recently started on empagliflozin.  He does not have a glucometer at home, last a1c was 7.0%     HTN   -he does not have a BP cuff at home since his last one broke.  Today it is 134/80.     Shortness of air   -he had shortness of breath prior to his ablation, but it improved after his ablation. In the past 1 month, he has had worsened dyspnea on exertion.  He denies any weight gain or LE edema. No orthopnea.  He denies dyspnea at rest. No CP or palpitations. Does endorse some orthostasis with standing.        Objective   Vital Signs:   /80   Pulse 88   Temp 97 °F (36.1 °C)   Resp 20   Ht 176.5 cm (69.5\")   Wt 107 kg (235 lb)   SpO2 91%   BMI 34.21 kg/m²     Physical Exam  Constitutional:       Appearance: Normal appearance.   HENT:      Head: Normocephalic and atraumatic.   Eyes:      General:         Right eye: No discharge.         Left eye: No discharge.      Conjunctiva/sclera: Conjunctivae normal.   Cardiovascular:      Rate and Rhythm: Normal rate and regular rhythm.      Pulses: Normal pulses.      Heart sounds: No murmur heard.      Pulmonary:      Effort: Pulmonary effort is normal. No respiratory distress.      Breath sounds: Normal breath sounds. No wheezing.   Abdominal:      General: Abdomen is flat. There is no distension.      Palpations: Abdomen is soft.   Musculoskeletal:      Right lower leg: No edema.      Left lower leg: No edema.   Skin:     General: Skin is warm and dry.   Neurological:      Mental Status: He is alert.        Result Review :{Labs  Result Review  Imaging  Med Tab  Media  Procedures :23}                 Assessment and Plan    Diagnoses and all orders for this " visit:    1. Shortness of breath (Primary)    2. Hypertension, unspecified type    1. Shortness of breath   -this is a new and worsened problem over the past month.   -it had initially improved after his ablation   -diff dx includes PE vs COPD vs PNA vs HF exacerbation   -appears euvolemic on exam, no significant wheezing   -will order BNP, BMP, CBC, iron studies as well as CT PE protocol     2. Hypertension   -near goal today with 134/80   -continue current medications coreg 12.5mg BID, diltiazem 60mg and losartan 25mg daily       If evaluation as above without etiology asked himt o keep cardiology appt Monday to discuss echo, stress evaluation; could also consider further lung workup pending the cardiac evaluation but a month change in shortness of breath      Follow Up   No follow-ups on file.  Patient was given instructions and counseling regarding his condition or for health maintenance advice. Please see specific information pulled into the AVS if appropriate.

## 2021-10-16 LAB
ALBUMIN SERPL-MCNC: 4.4 G/DL (ref 3.5–5.2)
ALBUMIN/GLOB SERPL: 1.9 G/DL
ALP SERPL-CCNC: 242 U/L (ref 39–117)
ALT SERPL-CCNC: 13 U/L (ref 1–41)
AST SERPL-CCNC: 21 U/L (ref 1–40)
BASOPHILS # BLD AUTO: 0.06 10*3/MM3 (ref 0–0.2)
BASOPHILS NFR BLD AUTO: 1.1 % (ref 0–1.5)
BILIRUB SERPL-MCNC: 0.5 MG/DL (ref 0–1.2)
BNP SERPL-MCNC: 84.2 PG/ML (ref 0–100)
BUN SERPL-MCNC: 8 MG/DL (ref 8–23)
BUN/CREAT SERPL: 8.6 (ref 7–25)
CALCIUM SERPL-MCNC: 9.5 MG/DL (ref 8.6–10.5)
CHLORIDE SERPL-SCNC: 100 MMOL/L (ref 98–107)
CO2 SERPL-SCNC: 30.7 MMOL/L (ref 22–29)
CREAT SERPL-MCNC: 0.93 MG/DL (ref 0.76–1.27)
EOSINOPHIL # BLD AUTO: 0.19 10*3/MM3 (ref 0–0.4)
EOSINOPHIL NFR BLD AUTO: 3.5 % (ref 0.3–6.2)
ERYTHROCYTE [DISTWIDTH] IN BLOOD BY AUTOMATED COUNT: 12.7 % (ref 12.3–15.4)
FERRITIN SERPL-MCNC: 41.7 NG/ML (ref 30–400)
GLOBULIN SER CALC-MCNC: 2.3 GM/DL
GLUCOSE SERPL-MCNC: 132 MG/DL (ref 65–99)
HCT VFR BLD AUTO: 47.1 % (ref 37.5–51)
HGB BLD-MCNC: 16.1 G/DL (ref 13–17.7)
IMM GRANULOCYTES # BLD AUTO: 0.01 10*3/MM3 (ref 0–0.05)
IMM GRANULOCYTES NFR BLD AUTO: 0.2 % (ref 0–0.5)
IRON SATN MFR SERPL: 14 % (ref 20–50)
IRON SERPL-MCNC: 66 MCG/DL (ref 59–158)
LYMPHOCYTES # BLD AUTO: 1.83 10*3/MM3 (ref 0.7–3.1)
LYMPHOCYTES NFR BLD AUTO: 33.6 % (ref 19.6–45.3)
MCH RBC QN AUTO: 32.7 PG (ref 26.6–33)
MCHC RBC AUTO-ENTMCNC: 34.2 G/DL (ref 31.5–35.7)
MCV RBC AUTO: 95.5 FL (ref 79–97)
MONOCYTES # BLD AUTO: 0.77 10*3/MM3 (ref 0.1–0.9)
MONOCYTES NFR BLD AUTO: 14.1 % (ref 5–12)
NEUTROPHILS # BLD AUTO: 2.59 10*3/MM3 (ref 1.7–7)
NEUTROPHILS NFR BLD AUTO: 47.5 % (ref 42.7–76)
NRBC BLD AUTO-RTO: 0 /100 WBC (ref 0–0.2)
PLATELET # BLD AUTO: 203 10*3/MM3 (ref 140–450)
POTASSIUM SERPL-SCNC: 4.2 MMOL/L (ref 3.5–5.2)
PROT SERPL-MCNC: 6.7 G/DL (ref 6–8.5)
RBC # BLD AUTO: 4.93 10*6/MM3 (ref 4.14–5.8)
SODIUM SERPL-SCNC: 140 MMOL/L (ref 136–145)
TIBC SERPL-MCNC: 482 MCG/DL
TSH SERPL DL<=0.005 MIU/L-ACNC: 0.96 UIU/ML (ref 0.27–4.2)
UIBC SERPL-MCNC: 416 MCG/DL (ref 112–346)
WBC # BLD AUTO: 5.45 10*3/MM3 (ref 3.4–10.8)

## 2022-02-09 NOTE — TELEPHONE ENCOUNTER
Rx Refill Note  Requested Prescriptions     Pending Prescriptions Disp Refills   • FLUoxetine (PROzac) 20 MG tablet [Pharmacy Med Name: FLUoxetine HCL 20 MG TABLET] 30 tablet      Sig: TAKE ONE TABLET BY MOUTH DAILY      Last office visit with prescribing clinician: 10/15/2021      Next office visit with prescribing clinician: Visit date not found            Marybel Sanchez MA  02/09/22, 07:41 EST

## 2022-02-10 RX ORDER — FLUOXETINE 20 MG/1
TABLET, FILM COATED ORAL
Qty: 90 TABLET | Refills: 2 | Status: SHIPPED | OUTPATIENT
Start: 2022-02-10 | End: 2022-09-29 | Stop reason: SDUPTHER

## 2022-09-29 ENCOUNTER — OFFICE VISIT (OUTPATIENT)
Dept: INTERNAL MEDICINE | Facility: CLINIC | Age: 67
End: 2022-09-29

## 2022-09-29 VITALS
OXYGEN SATURATION: 98 % | TEMPERATURE: 98.4 F | SYSTOLIC BLOOD PRESSURE: 140 MMHG | RESPIRATION RATE: 16 BRPM | HEART RATE: 78 BPM | WEIGHT: 231 LBS | DIASTOLIC BLOOD PRESSURE: 86 MMHG | BODY MASS INDEX: 33.07 KG/M2 | HEIGHT: 70 IN

## 2022-09-29 DIAGNOSIS — F33.9 RECURRENT MAJOR DEPRESSIVE DISORDER, REMISSION STATUS UNSPECIFIED: ICD-10-CM

## 2022-09-29 DIAGNOSIS — E11.65 TYPE 2 DIABETES MELLITUS WITH HYPERGLYCEMIA, WITHOUT LONG-TERM CURRENT USE OF INSULIN: Primary | ICD-10-CM

## 2022-09-29 DIAGNOSIS — F41.1 GAD (GENERALIZED ANXIETY DISORDER): ICD-10-CM

## 2022-09-29 PROCEDURE — 99214 OFFICE O/P EST MOD 30 MIN: CPT | Performed by: INTERNAL MEDICINE

## 2022-09-29 RX ORDER — FLUOXETINE 20 MG/1
20 TABLET, FILM COATED ORAL DAILY
Qty: 90 TABLET | Refills: 2 | Status: SHIPPED | OUTPATIENT
Start: 2022-09-29

## 2022-09-29 RX ORDER — FLUOXETINE 20 MG/1
20 TABLET, FILM COATED ORAL DAILY
Qty: 90 TABLET | Refills: 2 | Status: SHIPPED | OUTPATIENT
Start: 2022-09-29 | End: 2022-12-29

## 2022-09-29 NOTE — TELEPHONE ENCOUNTER
Rx Refill Note  Requested Prescriptions     Pending Prescriptions Disp Refills   • FLUoxetine (PROzac) 20 MG tablet 90 tablet 2     Sig: Take 1 tablet by mouth Daily.      Last office visit with prescribing clinician: 9/29/2022      Next office visit with prescribing clinician: Visit date not found            Nhan Ford MA  09/29/22, 09:05 EDT

## 2022-09-30 LAB
ALBUMIN SERPL-MCNC: 4.4 G/DL (ref 3.5–5.2)
ALBUMIN/CREAT UR: 112 MG/G CREAT (ref 0–29)
ALBUMIN/GLOB SERPL: 2.1 G/DL
ALP SERPL-CCNC: 270 U/L (ref 39–117)
ALT SERPL-CCNC: 13 U/L (ref 1–41)
AST SERPL-CCNC: 19 U/L (ref 1–40)
BILIRUB SERPL-MCNC: 0.5 MG/DL (ref 0–1.2)
BUN SERPL-MCNC: 8 MG/DL (ref 8–23)
BUN/CREAT SERPL: 9.2 (ref 7–25)
CALCIUM SERPL-MCNC: 9.4 MG/DL (ref 8.6–10.5)
CHLORIDE SERPL-SCNC: 97 MMOL/L (ref 98–107)
CHOLEST SERPL-MCNC: 224 MG/DL (ref 0–200)
CHOLEST/HDLC SERPL: 5.46 {RATIO}
CO2 SERPL-SCNC: 33.9 MMOL/L (ref 22–29)
CREAT SERPL-MCNC: 0.87 MG/DL (ref 0.76–1.27)
CREAT UR-MCNC: 189.4 MG/DL
EGFRCR SERPLBLD CKD-EPI 2021: 94.6 ML/MIN/1.73
GLOBULIN SER CALC-MCNC: 2.1 GM/DL
GLUCOSE SERPL-MCNC: 244 MG/DL (ref 65–99)
HBA1C MFR BLD: 9.2 % (ref 4.8–5.6)
HDLC SERPL-MCNC: 41 MG/DL (ref 40–60)
LDLC SERPL CALC-MCNC: 149 MG/DL (ref 0–100)
MICROALBUMIN UR-MCNC: 212.1 UG/ML
POTASSIUM SERPL-SCNC: 4.4 MMOL/L (ref 3.5–5.2)
PROT SERPL-MCNC: 6.5 G/DL (ref 6–8.5)
SODIUM SERPL-SCNC: 142 MMOL/L (ref 136–145)
TRIGL SERPL-MCNC: 188 MG/DL (ref 0–150)
VLDLC SERPL CALC-MCNC: 34 MG/DL (ref 5–40)

## 2022-12-29 ENCOUNTER — OFFICE VISIT (OUTPATIENT)
Dept: INTERNAL MEDICINE | Facility: CLINIC | Age: 67
End: 2022-12-29
Payer: MEDICARE

## 2022-12-29 VITALS
DIASTOLIC BLOOD PRESSURE: 86 MMHG | SYSTOLIC BLOOD PRESSURE: 134 MMHG | RESPIRATION RATE: 18 BRPM | TEMPERATURE: 97.8 F | OXYGEN SATURATION: 93 % | WEIGHT: 228 LBS | HEART RATE: 116 BPM | BODY MASS INDEX: 32.64 KG/M2 | HEIGHT: 70 IN

## 2022-12-29 DIAGNOSIS — I48.0 PAROXYSMAL ATRIAL FIBRILLATION: ICD-10-CM

## 2022-12-29 DIAGNOSIS — E61.1 IRON DEFICIENCY: ICD-10-CM

## 2022-12-29 DIAGNOSIS — E11.65 TYPE 2 DIABETES MELLITUS WITH HYPERGLYCEMIA, WITHOUT LONG-TERM CURRENT USE OF INSULIN: ICD-10-CM

## 2022-12-29 DIAGNOSIS — R06.02 SHORTNESS OF BREATH: ICD-10-CM

## 2022-12-29 DIAGNOSIS — E67.8 OTHER SPECIFIED HYPERALIMENTATION: ICD-10-CM

## 2022-12-29 DIAGNOSIS — R74.8 ABNORMAL LEVELS OF OTHER SERUM ENZYMES: ICD-10-CM

## 2022-12-29 DIAGNOSIS — R53.83 OTHER FATIGUE: Primary | ICD-10-CM

## 2022-12-29 PROCEDURE — 99215 OFFICE O/P EST HI 40 MIN: CPT | Performed by: INTERNAL MEDICINE

## 2022-12-29 RX ORDER — DILTIAZEM HYDROCHLORIDE 240 MG/1
240 CAPSULE, COATED, EXTENDED RELEASE ORAL DAILY
COMMUNITY
Start: 2022-10-05

## 2022-12-29 RX ORDER — FUROSEMIDE 40 MG/1
40 TABLET ORAL DAILY
COMMUNITY
Start: 2022-10-05

## 2022-12-29 NOTE — PROGRESS NOTES
Chief Complaint  Fatigue and Shortness of Breath (X 1 week )    Subjective        Robert Manning presents to [facility]  History of Present Illness  Here with 1 week of fatigue, low energy, no cough, no fevers, mild congestion, no sore throat; no chest pain, feels out of breath, sob at times, more easily winded      Objective   Vital Signs:  /86   Pulse 116   Temp 97.8 °F (36.6 °C)   Resp 18   Ht 176.5 cm (69.5\")   Wt 103 kg (228 lb)   SpO2 93%   BMI 33.19 kg/m²   Estimated body mass index is 33.19 kg/m² as calculated from the following:    Height as of this encounter: 176.5 cm (69.5\").    Weight as of this encounter: 103 kg (228 lb).          Physical Exam  Vitals and nursing note reviewed.   Constitutional:       General: He is not in acute distress.     Appearance: Normal appearance.   HENT:      Head: Normocephalic and atraumatic.      Right Ear: External ear normal.      Left Ear: External ear normal.      Nose: Nose normal.      Mouth/Throat:      Mouth: Mucous membranes are moist.      Pharynx: Oropharynx is clear.   Eyes:      Extraocular Movements: Extraocular movements intact.      Conjunctiva/sclera: Conjunctivae normal.      Pupils: Pupils are equal, round, and reactive to light.   Cardiovascular:      Rate and Rhythm: Normal rate and regular rhythm.      Pulses: Normal pulses.      Heart sounds: Normal heart sounds. No murmur heard.    No gallop.   Pulmonary:      Effort: Pulmonary effort is normal.      Breath sounds: Normal breath sounds.   Abdominal:      General: Abdomen is flat. Bowel sounds are normal. There is no distension.      Palpations: Abdomen is soft. There is no mass.      Tenderness: There is no abdominal tenderness.   Musculoskeletal:         General: No swelling. Normal range of motion.      Cervical back: Normal range of motion and neck supple.   Skin:     General: Skin is warm and dry.      Findings: No rash.    Neurological:      General: No focal deficit present.      Mental Status: He is alert and oriented to person, place, and time. Mental status is at baseline.   Psychiatric:         Mood and Affect: Mood normal.         Behavior: Behavior normal.        Result Review :           ECG 12 Lead    Date/Time: 1/2/2023 9:06 PM  Performed by: Mandeep Shankar MD  Authorized by: Mandeep Shankar MD   Comparison: compared with previous ECG                 Assessment and Plan   Diagnoses and all orders for this visit:    1. Other fatigue (Primary)  -     ECG 12 Lead  -     Hemoglobin A1c  -     Lipid Panel With / Chol / HDL Ratio  -     Microalbumin / Creatinine Urine Ratio - Urine, Clean Catch  -     Iron and TIBC  -     Ferritin  -     CBC & Differential  -     Comprehensive Metabolic Panel  -     TSH  -     Vitamin B12  -     Gamma GT  -     Vitamin D 25 hydroxy    2. Shortness of breath  -     ECG 12 Lead  -     Hemoglobin A1c  -     Lipid Panel With / Chol / HDL Ratio  -     Microalbumin / Creatinine Urine Ratio - Urine, Clean Catch  -     Iron and TIBC  -     Ferritin  -     CBC & Differential  -     Comprehensive Metabolic Panel  -     TSH  -     Vitamin B12  -     Gamma GT  -     Vitamin D 25 hydroxy    3. Paroxysmal atrial fibrillation (HCC)  -     ECG 12 Lead    4. Type 2 diabetes mellitus with hyperglycemia, without long-term current use of insulin (HCC)  -     Hemoglobin A1c  -     Lipid Panel With / Chol / HDL Ratio  -     Microalbumin / Creatinine Urine Ratio - Urine, Clean Catch    5. Iron deficiency  -     Iron and TIBC  -     Ferritin    6. Abnormal levels of other serum enzymes  -     Gamma GT    7. Other specified hyperalimentation  -     Vitamin D 25 hydroxy      - EKG reviewed today, will get records from previous cardiology visits but per report from PA from rhythm center he had been in 3:1 flutter last remote device check  - his med records are not complete as cardiology had him taking BB at some  point; will restart carvedilol; have documented his meds, not taking sotalol; suspect he needs more definitive rate/rhythm management, will defer to EP, secured appt next week, they will contact him; if unable to get this will have him follow up with me to adjust BB  - discussed risks/benefits/alternatives of meds/treatments  - check labs as above  - will get follow up with     I spent 41 minutes caring for  on this date of service. This time includes time spent by me in the following activities:preparing for the visit, reviewing tests, obtaining and/or reviewing a separately obtained history, performing a medically appropriate examination and/or evaluation , counseling and educating the patient/family/caregiver, ordering medications, tests, or procedures, referring and communicating with other health care professionals , documenting information in the medical record and independently interpreting results and communicating that information with the patient/family/caregiver  Follow Up   No follow-ups on file.  Patient was given instructions and counseling regarding his condition or for health maintenance advice. Please see specific information pulled into the AVS if appropriate.

## 2022-12-30 LAB
25(OH)D3+25(OH)D2 SERPL-MCNC: 29.7 NG/ML (ref 30–100)
ALBUMIN SERPL-MCNC: 4.1 G/DL (ref 3.5–5.2)
ALBUMIN/CREAT UR: 114 MG/G CREAT (ref 0–29)
ALBUMIN/GLOB SERPL: 1.6 G/DL
ALP SERPL-CCNC: 244 U/L (ref 39–117)
ALT SERPL-CCNC: 19 U/L (ref 1–41)
AST SERPL-CCNC: 32 U/L (ref 1–40)
BASOPHILS # BLD AUTO: 0.05 10*3/MM3 (ref 0–0.2)
BASOPHILS NFR BLD AUTO: 1.1 % (ref 0–1.5)
BILIRUB SERPL-MCNC: 0.5 MG/DL (ref 0–1.2)
BUN SERPL-MCNC: 8 MG/DL (ref 8–23)
BUN/CREAT SERPL: 9.3 (ref 7–25)
CALCIUM SERPL-MCNC: 9 MG/DL (ref 8.6–10.5)
CHLORIDE SERPL-SCNC: 98 MMOL/L (ref 98–107)
CHOLEST SERPL-MCNC: 181 MG/DL (ref 0–200)
CHOLEST/HDLC SERPL: 5.66 {RATIO}
CO2 SERPL-SCNC: 30.2 MMOL/L (ref 22–29)
CREAT SERPL-MCNC: 0.86 MG/DL (ref 0.76–1.27)
CREAT UR-MCNC: 238.5 MG/DL
EGFRCR SERPLBLD CKD-EPI 2021: 94.9 ML/MIN/1.73
EOSINOPHIL # BLD AUTO: 0.11 10*3/MM3 (ref 0–0.4)
EOSINOPHIL NFR BLD AUTO: 2.4 % (ref 0.3–6.2)
ERYTHROCYTE [DISTWIDTH] IN BLOOD BY AUTOMATED COUNT: 13.1 % (ref 12.3–15.4)
FERRITIN SERPL-MCNC: 42.3 NG/ML (ref 30–400)
GGT SERPL-CCNC: 30 U/L (ref 8–61)
GLOBULIN SER CALC-MCNC: 2.6 GM/DL
GLUCOSE SERPL-MCNC: 142 MG/DL (ref 65–99)
HBA1C MFR BLD: 8.9 % (ref 4.8–5.6)
HCT VFR BLD AUTO: 47.2 % (ref 37.5–51)
HDLC SERPL-MCNC: 32 MG/DL (ref 40–60)
HGB BLD-MCNC: 15.9 G/DL (ref 13–17.7)
IMM GRANULOCYTES # BLD AUTO: 0.01 10*3/MM3 (ref 0–0.05)
IMM GRANULOCYTES NFR BLD AUTO: 0.2 % (ref 0–0.5)
IRON SATN MFR SERPL: 10 % (ref 20–50)
IRON SERPL-MCNC: 54 MCG/DL (ref 59–158)
LDLC SERPL CALC-MCNC: 116 MG/DL (ref 0–100)
LYMPHOCYTES # BLD AUTO: 1.51 10*3/MM3 (ref 0.7–3.1)
LYMPHOCYTES NFR BLD AUTO: 33.3 % (ref 19.6–45.3)
MCH RBC QN AUTO: 29.9 PG (ref 26.6–33)
MCHC RBC AUTO-ENTMCNC: 33.7 G/DL (ref 31.5–35.7)
MCV RBC AUTO: 88.7 FL (ref 79–97)
MICROALBUMIN UR-MCNC: 272.6 UG/ML
MONOCYTES # BLD AUTO: 0.52 10*3/MM3 (ref 0.1–0.9)
MONOCYTES NFR BLD AUTO: 11.5 % (ref 5–12)
NEUTROPHILS # BLD AUTO: 2.34 10*3/MM3 (ref 1.7–7)
NEUTROPHILS NFR BLD AUTO: 51.5 % (ref 42.7–76)
NRBC BLD AUTO-RTO: 0.2 /100 WBC (ref 0–0.2)
PLATELET # BLD AUTO: 222 10*3/MM3 (ref 140–450)
POTASSIUM SERPL-SCNC: 3.9 MMOL/L (ref 3.5–5.2)
PROT SERPL-MCNC: 6.7 G/DL (ref 6–8.5)
RBC # BLD AUTO: 5.32 10*6/MM3 (ref 4.14–5.8)
SODIUM SERPL-SCNC: 142 MMOL/L (ref 136–145)
TIBC SERPL-MCNC: 551 MCG/DL
TRIGL SERPL-MCNC: 189 MG/DL (ref 0–150)
TSH SERPL DL<=0.005 MIU/L-ACNC: 0.9 UIU/ML (ref 0.27–4.2)
UIBC SERPL-MCNC: 497 MCG/DL (ref 112–346)
VIT B12 SERPL-MCNC: 346 PG/ML (ref 211–946)
VLDLC SERPL CALC-MCNC: 33 MG/DL (ref 5–40)
WBC # BLD AUTO: 4.54 10*3/MM3 (ref 3.4–10.8)

## 2023-01-02 PROCEDURE — 93010 ELECTROCARDIOGRAM REPORT: CPT | Performed by: INTERNAL MEDICINE

## 2023-01-02 RX ORDER — CARVEDILOL 6.25 MG/1
6.25 TABLET ORAL 2 TIMES DAILY WITH MEALS
Qty: 60 TABLET | Refills: 3 | Status: SHIPPED | OUTPATIENT
Start: 2023-01-02

## 2023-10-30 RX ORDER — FLUOXETINE 20 MG/1
20 TABLET, FILM COATED ORAL DAILY
Qty: 90 TABLET | Refills: 2 | Status: SHIPPED | OUTPATIENT
Start: 2023-10-30